# Patient Record
Sex: MALE | Race: WHITE
[De-identification: names, ages, dates, MRNs, and addresses within clinical notes are randomized per-mention and may not be internally consistent; named-entity substitution may affect disease eponyms.]

---

## 2017-01-11 ENCOUNTER — HOSPITAL ENCOUNTER (INPATIENT)
Dept: HOSPITAL 80 - FCATH | Age: 72
LOS: 2 days | Discharge: HOME | DRG: 310 | End: 2017-01-13
Attending: INTERNAL MEDICINE | Admitting: INTERNAL MEDICINE
Payer: COMMERCIAL

## 2017-01-11 DIAGNOSIS — Z79.01: ICD-10-CM

## 2017-01-11 DIAGNOSIS — E78.5: ICD-10-CM

## 2017-01-11 DIAGNOSIS — Z87.891: ICD-10-CM

## 2017-01-11 DIAGNOSIS — E11.9: ICD-10-CM

## 2017-01-11 DIAGNOSIS — I48.92: ICD-10-CM

## 2017-01-11 DIAGNOSIS — I48.1: Primary | ICD-10-CM

## 2017-01-11 LAB
ANION GAP SERPL CALC-SCNC: 11 MEQ/L (ref 8–16)
APTT BLD: 34.1 SEC (ref 23–38)
CALCIUM SERPL-MCNC: 9 MG/DL (ref 8.5–10.4)
CHLORIDE SERPL-SCNC: 108 MEQ/L (ref 97–110)
CO2 SERPL-SCNC: 23 MEQ/L (ref 22–31)
CREAT SERPL-MCNC: 1.1 MG/DL (ref 0.7–1.3)
GFR SERPL CREATININE-BSD FRML MDRD: > 60 ML/MIN/{1.73_M2}
GLUCOSE SERPL-MCNC: 96 MG/DL (ref 70–100)
INR PPP: 1.85 (ref 0.83–1.16)
MAGNESIUM SERPL-MCNC: 1.9 MG/DL (ref 1.6–2.3)
POTASSIUM SERPL-SCNC: 4.8 MEQ/L (ref 3.5–5.2)
PROTHROMBIN TIME: 21.4 SEC (ref 12–15)
SODIUM SERPL-SCNC: 142 MEQ/L (ref 134–144)

## 2017-01-11 PROCEDURE — G0379 DIRECT REFER HOSPITAL OBSERV: HCPCS

## 2017-01-11 PROCEDURE — G0378 HOSPITAL OBSERVATION PER HR: HCPCS

## 2017-01-11 RX ADMIN — RIVAROXABAN SCH MG: 10 TABLET, FILM COATED ORAL at 21:27

## 2017-01-11 RX ADMIN — GABAPENTIN SCH MG: 300 CAPSULE ORAL at 21:27

## 2017-01-11 RX ADMIN — ATORVASTATIN CALCIUM SCH MG: 40 TABLET, FILM COATED ORAL at 21:27

## 2017-01-11 RX ADMIN — DOFETILIDE SCH MG: 0.25 CAPSULE ORAL at 21:27

## 2017-01-11 NOTE — CPEKG
Heart Rate: 89

RR Interval: 674

QRSD Interval: 84

QT Interval: 388

QTC Interval: 473

QRS Axis: 70

T Wave Axis: -23

EKG Severity - ABNORMAL ECG -

EKG Impression: ATRIAL FLUTTER/FIBRILLATION, A-RATE 246

EKG Impression: BORDERLINE T ABNORMALITIES, INFERIOR LEADS

Electronically Signed By: Gilberto Guadarrama 11-Jan-2017 16:52:52

## 2017-01-11 NOTE — CPEKG
Heart Rate: 59

RR Interval: 1017

P-R Interval: 192

QRSD Interval: 88

QT Interval: 460

QTC Interval: 456

P Axis: 53

QRS Axis: 77

T Wave Axis: 31

EKG Severity - NORMAL ECG -

EKG Impression: SINUS RHYTHM

Electronically Signed By: Gilberto Guadarrama 12-Jan-2017 17:03:05

## 2017-01-11 NOTE — CPEKG
Heart Rate: 62

RR Interval: 968

P-R Interval: 180

QRSD Interval: 88

QT Interval: 448

QTC Interval: 455

P Axis: 51

QRS Axis: 71

T Wave Axis: -6

EKG Severity - BORDERLINE ECG -

EKG Impression: SINUS RHYTHM

EKG Impression: BORDERLINE T ABNORMALITIES, INFERIOR LEADS

Electronically Signed By: Gilberto Guadarrama 11-Jan-2017 16:52:37

## 2017-01-11 NOTE — CPEKG
Heart Rate: 48

RR Interval: 1250

P-R Interval: 192

QRSD Interval: 86

QT Interval: 444

QTC Interval: 397

P Axis: 28

QRS Axis: 52

T Wave Axis: 7

EKG Severity - OTHERWISE NORMAL ECG -

EKG Impression: SINUS BRADYCARDIA

EKG Impression: ATRIAL PREMATURE COMPLEX

Electronically Signed By: Gilberto Guadarrama 11-Jan-2017 16:52:44

## 2017-01-11 NOTE — PDCARPN
[id number] of atrial fibrillation s/p CV today.  He will be admitted for 

Tikosyn loading.  Monitor with routine EKG's and labs.  




















01/11/17 15:12





Subjective: 


He currently has no complaints


Objective: 





 Vital Signs (8 Hrs)











  Temp Pulse Resp BP Pulse Ox


 


 01/11/17 14:45  36.9 C  61  18  108/73  96


 


 01/11/17 14:31    18  110/68  94


 


 01/11/17 14:15    15  107/67  95


 


 01/11/17 14:00    15  109/67  94


 


 01/11/17 13:46    14  117/63  94


 


 01/11/17 13:35    13  109/63  98


 


 01/11/17 13:33    9 L  101/60  97


 


 01/11/17 13:31    17  101/67  98


 


 01/11/17 13:28    16  103/62  98


 


 01/11/17 13:26    17  109/67  99


 


 01/11/17 13:23    19  108/69  99


 


 01/11/17 13:22    17  106/74  99


 


 01/11/17 13:18    10 L  128/83 H  99


 


 01/11/17 13:16    7 L  128/81 H  96


 


 01/11/17 13:01    19  128/90 H  96


 


 01/11/17 13:00    15   96


 


 01/11/17 12:45    15  145/99 H  95


 


 01/11/17 12:36    21 H  131/83 H  94


 


 01/11/17 12:34      96








 Intake/Output (24 Hrs)











 01/10/17 01/11/17 01/12/17





 05:59 05:59 05:59


 


Other:   


 


  Weight   120.2 kg











Result Diagrams: 


 01/12/17 03:22





ICD10 Worksheet


Patient Problems: 


 Problems











Problem Status Diagnosed


 


Persistent atrial fibrillation Acute

## 2017-01-12 LAB
ALT SERPL-CCNC: 37 IU/L (ref 21–72)
ANION GAP SERPL CALC-SCNC: 10 MEQ/L (ref 8–16)
AST SERPL-CCNC: 30 IU/L (ref 17–59)
CALCIUM SERPL-MCNC: 8.7 MG/DL (ref 8.5–10.4)
CHLORIDE SERPL-SCNC: 107 MEQ/L (ref 97–110)
CO2 SERPL-SCNC: 24 MEQ/L (ref 22–31)
CREAT SERPL-MCNC: 1.1 MG/DL (ref 0.7–1.3)
GFR SERPL CREATININE-BSD FRML MDRD: > 60 ML/MIN/{1.73_M2}
GLUCOSE SERPL-MCNC: 81 MG/DL (ref 70–100)
INR PPP: 1.58 (ref 0.83–1.16)
INR PPP: 3.13 (ref 0.83–1.16)
MAGNESIUM SERPL-MCNC: 2 MG/DL (ref 1.6–2.3)
POTASSIUM SERPL-SCNC: 4.3 MEQ/L (ref 3.5–5.2)
PROTHROMBIN TIME: 18.9 SEC (ref 12–15)
PROTHROMBIN TIME: 32.6 SEC (ref 12–15)
SODIUM SERPL-SCNC: 141 MEQ/L (ref 134–144)

## 2017-01-12 RX ADMIN — IRBESARTAN SCH MG: 150 TABLET ORAL at 08:45

## 2017-01-12 RX ADMIN — SERTRALINE HYDROCHLORIDE SCH MG: 50 TABLET, FILM COATED ORAL at 08:45

## 2017-01-12 RX ADMIN — DOFETILIDE SCH MG: 0.25 CAPSULE ORAL at 08:44

## 2017-01-12 RX ADMIN — PANTOPRAZOLE SODIUM SCH MG: 40 TABLET, DELAYED RELEASE ORAL at 08:44

## 2017-01-12 RX ADMIN — TESTOSTERONE SCH: 10 GEL TRANSDERMAL at 11:11

## 2017-01-12 RX ADMIN — METOPROLOL SUCCINATE SCH MG: 25 TABLET, EXTENDED RELEASE ORAL at 08:45

## 2017-01-12 RX ADMIN — RIVAROXABAN SCH MG: 10 TABLET, FILM COATED ORAL at 20:53

## 2017-01-12 RX ADMIN — GABAPENTIN SCH MG: 300 CAPSULE ORAL at 20:53

## 2017-01-12 RX ADMIN — ATORVASTATIN CALCIUM SCH MG: 40 TABLET, FILM COATED ORAL at 20:53

## 2017-01-12 RX ADMIN — GABAPENTIN SCH MG: 300 CAPSULE ORAL at 08:45

## 2017-01-12 RX ADMIN — TESTOSTERONE SCH GM: 10 GEL TRANSDERMAL at 12:43

## 2017-01-12 RX ADMIN — VITAMIN D, TAB 1000IU (100/BT) SCH UNITS: 25 TAB at 08:44

## 2017-01-12 RX ADMIN — DOFETILIDE SCH MG: 0.25 CAPSULE ORAL at 20:53

## 2017-01-12 NOTE — CPEKG
Heart Rate: 47

RR Interval: 1277

P-R Interval: 184

QRSD Interval: 88

QT Interval: 496

QTC Interval: 439

P Axis: 43

QRS Axis: 73

T Wave Axis: 39

EKG Severity - OTHERWISE NORMAL ECG -

EKG Impression: SINUS BRADYCARDIA

Electronically Signed By: Gilberto Guadarrama 12-Jan-2017 17:02:58

## 2017-01-12 NOTE — PDCARPN
Cardiology Progress Note


Assessment/Plan: 


Assessment/Plan:





The patient is a 72 y/o M with a history of PAF with suboptimal control with 

Sotalol and Multaq.  He is s/p CV on 1/11 and then admitted for Tikosyn 

loading.  His QTc is adequate.  Continue Tikosyn 500mcg BID.  Likely D/c 

tomorrow morning.  INR was greater than 3.  He is on Xarelto but his INR is 

higher than expected.  Repeat INR and check LFT's tomorrow am.








01/12/17 12:44





Subjective: 


PT denies any palpitations, CP, or SOB.


Objective: 





 Vital Signs (8 Hrs)











  Temp Pulse Resp BP Pulse Ox


 


 01/12/17 08:00  36.4 C  70  17  131/74 H  96


 


 01/12/17 03:25  36.4 C  56 L  19  112/58 L  96








 Intake/Output (24 Hrs)











 01/11/17 01/12/17 01/13/17





 05:59 05:59 05:59


 


Intake Total  1130 


 


Balance  1130 


 


Intake:   


 


  Oral (ml)  1130 


 


Other:   


 


  Weight  120.2 kg 


 


  Number of Voids   


 


    Toilet  1 











Result Diagrams: 


 01/12/17 03:22


EKG: 


Qtc 439


Telemetry: 


NSR





- Physical Exam


Constitutional: WDWN


Cardiovascular: regular rate and rhythm, no murmurs, no rubs, no gallops


Respiratory: clear to auscultate bilat, no crackles, no wheezes


Skin: no edema


Neurologic: AAOx3





ICD10 Worksheet


Patient Problems: 


 Problems











Problem Status Diagnosed


 


Persistent atrial fibrillation Acute 














- ICD10 Problem Qualifiers


(1) Persistent atrial fibrillation

## 2017-01-12 NOTE — CPEKG
Heart Rate: 58

RR Interval: 1034

P-R Interval: 180

QRSD Interval: 90

QT Interval: 464

QTC Interval: 456

P Axis: 55

QRS Axis: 72

T Wave Axis: 30

EKG Severity - NORMAL ECG -

EKG Impression: SINUS RHYTHM

Electronically Signed By: Gilberto Guadarrama 13-Jan-2017 17:42:07

## 2017-01-13 VITALS
OXYGEN SATURATION: 97 % | RESPIRATION RATE: 16 BRPM | SYSTOLIC BLOOD PRESSURE: 133 MMHG | HEART RATE: 58 BPM | DIASTOLIC BLOOD PRESSURE: 79 MMHG | TEMPERATURE: 97.7 F

## 2017-01-13 PROCEDURE — 5A2204Z RESTORATION OF CARDIAC RHYTHM, SINGLE: ICD-10-PCS | Performed by: INTERNAL MEDICINE

## 2017-01-13 RX ADMIN — VITAMIN D, TAB 1000IU (100/BT) SCH UNITS: 25 TAB at 08:31

## 2017-01-13 RX ADMIN — GABAPENTIN SCH MG: 300 CAPSULE ORAL at 08:30

## 2017-01-13 RX ADMIN — PANTOPRAZOLE SODIUM SCH MG: 40 TABLET, DELAYED RELEASE ORAL at 08:29

## 2017-01-13 RX ADMIN — METOPROLOL SUCCINATE SCH MG: 25 TABLET, EXTENDED RELEASE ORAL at 08:30

## 2017-01-13 RX ADMIN — TESTOSTERONE SCH GM: 10 GEL TRANSDERMAL at 08:27

## 2017-01-13 RX ADMIN — DOFETILIDE SCH MG: 0.25 CAPSULE ORAL at 08:26

## 2017-01-13 RX ADMIN — SERTRALINE HYDROCHLORIDE SCH MG: 50 TABLET, FILM COATED ORAL at 08:30

## 2017-01-13 RX ADMIN — IRBESARTAN SCH MG: 150 TABLET ORAL at 08:30

## 2017-01-13 NOTE — CPEKG
Heart Rate: 56

RR Interval: 1071

P-R Interval: 192

QRSD Interval: 88

QT Interval: 488

QTC Interval: 472

P Axis: 57

QRS Axis: 71

T Wave Axis: -18

EKG Severity - BORDERLINE ECG -

EKG Impression: SINUS RHYTHM

EKG Impression: non-specific T ABNORMALITIES, INFERIOR LEADS

Electronically Signed By: Gilberto Guadarrama 13-Jan-2017 17:42:01

## 2017-01-13 NOTE — GDS
[f 
rep st]



                                                             DISCHARGE SUMMARY





DISCHARGE DIAGNOSIS:  Atrial fibrillation status post cardioversion and Tikosyn 
loading.



HOSPITAL COURSE:  For detailed H and P, please see prior dictation.  Briefly, 
Milad Moser is a 71-year-old male with a history of persistent atrial 
fibrillation.  He failed Multaq therapy, and therefore was started on sotalol.  
He complained of shortness of breath with sotalol, and therefore this was 
discontinued as well.  He was admitted to the hospital on January 11th in 
atrial flutter.  He underwent a cardioversion by Dr. Jus Peralta which was 
successful.  He was then admitted to the hospital for Tikosyn loading.  He was 
started on 0.5 mg twice daily.  He has tolerated the dose well.  His QTc the 
day of discharge is 472.  He has been monitored on telemetry and has remained 
in normal sinus rhythm with occasional PVCs and brief runs of SVT.  His 
electrolytes have remained stable throughout the hospitalization.



PHYSICAL EXAMINATION:  GENERAL:  Patient appears in no acute distress.  VITALS:
  Blood pressure 131/74, heart rate 70, oxygen saturation of 96% on room air, 
afebrile.  LUNGS:  Clear to auscultation.  No wheezes, rhonchi, or crackles 
auscultated.  CARDIAC:  Regular rate and rhythm without any significant murmurs
, rubs or gallops appreciated.



DISCHARGE MEDICATIONS:  Tikosyn 0.5 mg p.o. twice daily, Lipitor 40 mg at 
bedtime, vitamin D3 5000 units daily, vitamin B12 a 1000 mcg IM every 30 days, 
gabapentin 300 mg twice daily, Avapro 150 mg daily, metoprolol succinate 25 mg 
daily, omeprazole 20 mg daily, Xarelto 20 mg daily, Zoloft 50 mg daily, 
testosterone 5 g transdermal daily, herbal supplement daily.



PLAN:  Milad is currently stable and ready for discharge home.  He will 
continue Tikosyn 0.5 mg twice a day.  He has been instructed to call our office 
to schedule a followup visit with Dr. Peralta in 2 weeks.



Greater than 30 minutes was spent coordinating the patients care today.  





Job #:  [id number]

MTDD

## 2017-01-13 NOTE — EPPROC
Electrophysiology Procedure Note: 


Procedure: CV


Indication: AF


Procedure: Pt sedated by anesthesia staff. Once sedated, he was cardioverted 

using 200J of DC. Pt successfully converted to SR.


Conclusion: Successful CV





Patient Problems: 


 Problems











Problem Status Diagnosed


 


Persistent atrial fibrillation Acute

## 2017-05-03 ENCOUNTER — HOSPITAL ENCOUNTER (OUTPATIENT)
Dept: HOSPITAL 80 - BHFA | Age: 72
End: 2017-05-03
Attending: INTERNAL MEDICINE
Payer: COMMERCIAL

## 2017-05-03 DIAGNOSIS — R06.00: ICD-10-CM

## 2017-05-03 DIAGNOSIS — E78.5: ICD-10-CM

## 2017-05-03 DIAGNOSIS — E11.9: ICD-10-CM

## 2017-05-03 DIAGNOSIS — I48.91: Primary | ICD-10-CM

## 2017-05-03 DIAGNOSIS — I48.92: ICD-10-CM

## 2018-06-25 ENCOUNTER — HOSPITAL ENCOUNTER (OUTPATIENT)
Dept: HOSPITAL 80 - BHFA | Age: 73
End: 2018-06-25
Attending: INTERNAL MEDICINE
Payer: COMMERCIAL

## 2018-06-25 DIAGNOSIS — I48.91: Primary | ICD-10-CM

## 2018-06-25 PROCEDURE — 93017 CV STRESS TEST TRACING ONLY: CPT

## 2018-06-25 PROCEDURE — 78452 HT MUSCLE IMAGE SPECT MULT: CPT

## 2018-06-25 PROCEDURE — A9500 TC99M SESTAMIBI: HCPCS

## 2018-06-27 ENCOUNTER — HOSPITAL ENCOUNTER (OUTPATIENT)
Dept: HOSPITAL 80 - BHFA | Age: 73
End: 2018-06-27
Attending: INTERNAL MEDICINE
Payer: COMMERCIAL

## 2018-06-27 DIAGNOSIS — I48.91: Primary | ICD-10-CM

## 2018-07-11 ENCOUNTER — HOSPITAL ENCOUNTER (OUTPATIENT)
Dept: HOSPITAL 80 - BHFA | Age: 73
End: 2018-07-11
Attending: INTERNAL MEDICINE
Payer: COMMERCIAL

## 2018-07-11 DIAGNOSIS — I48.91: Primary | ICD-10-CM

## 2018-07-26 ENCOUNTER — HOSPITAL ENCOUNTER (OUTPATIENT)
Dept: HOSPITAL 80 - FCATH | Age: 73
Discharge: HOME | End: 2018-07-26
Attending: INTERNAL MEDICINE
Payer: COMMERCIAL

## 2018-07-26 DIAGNOSIS — Z79.01: ICD-10-CM

## 2018-07-26 DIAGNOSIS — I25.10: ICD-10-CM

## 2018-07-26 DIAGNOSIS — I48.91: Primary | ICD-10-CM

## 2018-07-26 LAB
INR PPP: 1.15 (ref 0.83–1.16)
PLATELET # BLD: 170 10^3/UL (ref 150–400)
PROTHROMBIN TIME: 14.9 SEC (ref 12–15)

## 2018-07-26 PROCEDURE — B2151ZZ FLUOROSCOPY OF LEFT HEART USING LOW OSMOLAR CONTRAST: ICD-10-PCS | Performed by: INTERNAL MEDICINE

## 2018-07-26 PROCEDURE — 93005 ELECTROCARDIOGRAM TRACING: CPT

## 2018-07-26 PROCEDURE — 93458 L HRT ARTERY/VENTRICLE ANGIO: CPT

## 2018-07-26 PROCEDURE — B2111ZZ FLUOROSCOPY OF MULTIPLE CORONARY ARTERIES USING LOW OSMOLAR CONTRAST: ICD-10-PCS | Performed by: INTERNAL MEDICINE

## 2018-07-26 PROCEDURE — C1769 GUIDE WIRE: HCPCS

## 2018-07-26 PROCEDURE — 4A023N7 MEASUREMENT OF CARDIAC SAMPLING AND PRESSURE, LEFT HEART, PERCUTANEOUS APPROACH: ICD-10-PCS | Performed by: INTERNAL MEDICINE

## 2018-07-26 NOTE — PDPROPOC
Sedation Plan of Care


Sedation Plan of Care: vital signs stable, mental status noted, patient 

educated of risks, benefits, alternatives, patient can tolerate sedation


ASA Classification: ASA 1


Planned drugs: fentanyl, midazolam


Mallampati Score: Class 3


Mallampati Reference Image: 





Patient passed 3-3-2 rule?: Yes

## 2018-07-26 NOTE — CPEKG
Heart Rate: 36

RR Interval: 1667

P-R Interval: 174

QRSD Interval: 86

QT Interval: 524

QTC Interval: 406

P Axis: 19

QRS Axis: 64

T Wave Axis: 43

EKG Severity - BORDERLINE ECG -

EKG Impression: BRADYCARDIA WITH IRREGULAR RATE 33-39

Electronically Signed By: Stevenson Mejia 26-Jul-2018 13:18:52

## 2018-07-26 NOTE — PDDXCAT
Diagnostic Cath Note





- .


Date: 07/26/18


: Roge


Indication: other (Dyspnea on exertion as a possible anginal equivalent, 

abnormal nuclear stress test, and risk factors for CAD.)





- Procedure


Access: right wrist


Procedure: left heart catheterization, coronary angiography, left ventriculogram





- Materials


Left Heart Cath size: 5F


Left Heart Cath materials: other ([employer])





- Findings-Left Heart Catheterization


LM: Fluoroscopy reveals minimal calcification. Angiography demonstrates a 20-30

% mid-vessel stenosis.


LAD: The mid-LAD has a 30% stenosis; otherwise mild irregularities.


LCX: Minimal irregularities.


RCA: Minimal irregularities.


EDP: 30 mmHg


LVEF: 60%


Wall motion: Normal.


Complications: None


Estimated blood loss: <50ml


Closure method: TR Band


Assessment: 1) Normal LV systolic function.  2) Mild coronary atherosclerosis 

as described above.


Plan: 


The patient will be contacted by our office staff to arrange a followup 

appointment. At this point, recommendations are for good secondary prevention. 

He is on target with respect to his lipids with an LDL level today of 37 on his 

current statin dosage. He should monitor his hypertension at home to make sure 

that it is adequately controlled.





Dr. Peralta's office note mentions potentially starting low-dose beta blocker and 

furosemide. Initiation of beta blockade will be deferred today secondary to 

moderate bradycardia. A recent Holter monitor demonstrated an average heart 

rate of 66 bpm. I suspect a beta blocker can be initiated as an outpatient. Dr. Peralta also mentioned that the patient should reconnect with the pulmonary 

service for the evaluation of the adequacy of his COPD management.





Patient Problems: 


 Problems











Problem Status Onset


 


Persistent atrial fibrillation Acute

## 2018-11-08 ENCOUNTER — HOSPITAL ENCOUNTER (OUTPATIENT)
Dept: HOSPITAL 80 - BHFA | Age: 73
End: 2018-11-08
Attending: INTERNAL MEDICINE
Payer: COMMERCIAL

## 2018-11-08 DIAGNOSIS — I48.91: Primary | ICD-10-CM
